# Patient Record
Sex: FEMALE | Race: WHITE | NOT HISPANIC OR LATINO | Employment: PART TIME | URBAN - METROPOLITAN AREA
[De-identification: names, ages, dates, MRNs, and addresses within clinical notes are randomized per-mention and may not be internally consistent; named-entity substitution may affect disease eponyms.]

---

## 2019-02-21 ENCOUNTER — OFFICE VISIT (OUTPATIENT)
Dept: RHEUMATOLOGY | Facility: CLINIC | Age: 39
End: 2019-02-21
Payer: COMMERCIAL

## 2019-02-21 VITALS
RESPIRATION RATE: 16 BRPM | HEIGHT: 65 IN | DIASTOLIC BLOOD PRESSURE: 90 MMHG | TEMPERATURE: 98 F | SYSTOLIC BLOOD PRESSURE: 140 MMHG | HEART RATE: 72 BPM | BODY MASS INDEX: 24.49 KG/M2 | WEIGHT: 147 LBS

## 2019-02-21 DIAGNOSIS — M79.7 FIBROMYOSITIS: ICD-10-CM

## 2019-02-21 DIAGNOSIS — M46.90 INFLAMMATORY SPONDYLOPATHY, UNSPECIFIED SPINAL REGION: Primary | ICD-10-CM

## 2019-02-21 DIAGNOSIS — M54.17 LUMBOSACRAL RADICULOPATHY: ICD-10-CM

## 2019-02-21 PROBLEM — M47.817 LUMBOSACRAL SPONDYLOSIS: Status: ACTIVE | Noted: 2019-02-21

## 2019-02-21 PROBLEM — C73 PAPILLARY THYROID CARCINOMA: Status: ACTIVE | Noted: 2019-02-21

## 2019-02-21 PROBLEM — E04.9 GOITER: Status: ACTIVE | Noted: 2019-02-21

## 2019-02-21 PROBLEM — I73.00 RAYNAUD'S PHENOMENON: Status: ACTIVE | Noted: 2018-03-13

## 2019-02-21 PROBLEM — M70.60 TROCHANTERIC BURSITIS: Status: ACTIVE | Noted: 2019-02-21

## 2019-02-21 PROBLEM — I34.1 MITRAL VALVE PROLAPSE: Status: ACTIVE | Noted: 2019-02-21

## 2019-02-21 PROBLEM — M02.10: Status: ACTIVE | Noted: 2019-02-21

## 2019-02-21 PROBLEM — E11.9 TYPE 2 DIABETES MELLITUS WITHOUT COMPLICATION: Status: ACTIVE | Noted: 2019-02-21

## 2019-02-21 PROCEDURE — 99214 OFFICE O/P EST MOD 30 MIN: CPT | Mod: ,,, | Performed by: INTERNAL MEDICINE

## 2019-02-21 PROCEDURE — 99214 PR OFFICE/OUTPT VISIT, EST, LEVL IV, 30-39 MIN: ICD-10-PCS | Mod: ,,, | Performed by: INTERNAL MEDICINE

## 2019-02-21 RX ORDER — HYDROXYCHLOROQUINE SULFATE 200 MG/1
TABLET, FILM COATED ORAL
COMMUNITY
End: 2019-06-14 | Stop reason: SDUPTHER

## 2019-02-21 RX ORDER — HYDROCODONE BITARTRATE AND ACETAMINOPHEN 10; 325 MG/1; MG/1
TABLET ORAL
COMMUNITY
End: 2019-04-11 | Stop reason: SDUPTHER

## 2019-02-21 RX ORDER — CYCLOBENZAPRINE HCL 10 MG
TABLET ORAL
COMMUNITY
End: 2019-06-14 | Stop reason: SDUPTHER

## 2019-02-21 RX ORDER — SALSALATE 750 MG
TABLET ORAL
COMMUNITY
End: 2019-06-14 | Stop reason: SDUPTHER

## 2019-02-21 RX ORDER — CITALOPRAM 20 MG/1
TABLET, FILM COATED ORAL
COMMUNITY
End: 2019-11-01 | Stop reason: SDUPTHER

## 2019-02-21 RX ORDER — ZOLPIDEM TARTRATE 10 MG/1
TABLET ORAL
COMMUNITY
End: 2019-06-14 | Stop reason: SDUPTHER

## 2019-02-21 RX ORDER — OMEPRAZOLE 20 MG/1
CAPSULE, DELAYED RELEASE ORAL
COMMUNITY
End: 2019-06-14 | Stop reason: SDUPTHER

## 2019-02-21 RX ORDER — GABAPENTIN 300 MG/1
CAPSULE ORAL
COMMUNITY
End: 2019-06-14 | Stop reason: SDUPTHER

## 2019-02-21 RX ORDER — LEVOTHYROXINE SODIUM 150 UG/1
TABLET ORAL
COMMUNITY

## 2019-02-21 NOTE — PROGRESS NOTES
Subjective:       Patient ID: Gay Austin is a 39 y.o. female.    Chief Complaint: Follow-up    HPI No acutely swollen tender painful inflammed jointand continue on Plaquenil 200 mg BID with no ocular toxicity  Last eye clearance was OK.No GI upset with Salsalat as long as she takes omeprazole  And lower back and sciatica helped by Gabpapentin 300 mg 3 a day . Raynaus phenomena present with cold expsure with no finger tip ulcers . Hip Greater trochanteric bursiis stil symptomatic. Follow thyroid condition by [facility] Dr Godinez.         Current medications include:  Current Outpatient Medications on File Prior to Visit   Medication Sig Dispense Refill    citalopram (CELEXA) 20 MG tablet citalopram 20 mg tablet   Take 1 tablet once daily      cyclobenzaprine (FLEXERIL) 10 MG tablet cyclobenzaprine 10 mg tablet      gabapentin (NEURONTIN) 300 MG capsule gabapentin 300 mg capsule      HYDROcodone-acetaminophen (NORCO)  mg per tablet hydrocodone 10 mg-acetaminophen 325 mg tablet   Take 1 tablet by mouth 2-3 times a day as needed for severe pain only. (MUST LAST 30 DAYS)      hydroxychloroquine (PLAQUENIL) 200 mg tablet hydroxychloroquine 200 mg tablet      levothyroxine (SYNTHROID) 150 MCG tablet Synthroid 150 mcg tablet      norgestrel-ethinyl estradiol (LOW-OGESTREL, 28,) 0.3-30 mg-mcg per tablet Low-Ogestrel (28) 0.3 mg-30 mcg tablet      omeprazole (PRILOSEC) 20 MG capsule omeprazole 20 mg capsule,delayed release      salsalate (DISALCID) 750 MG Tab salsalate 750 mg tablet      zolpidem (AMBIEN) 10 mg Tab zolpidem 10 mg tablet   Take 1 tablet at bedtime as needed       No current facility-administered medications on file prior to visit.        Lab Results:  No results found for: WBC, RBC, HGB, HCT, MCV, COLORU, SPECGRAV, PHUR, WBCUR, NITRITE, GLUCOSEUR, KETONESU, BILIRUBINUR, RBCUR, NA, K, CL, CO2, GLU, BUN, CREATININE     Review of Systems   Constitutional: Positive for chills and fever.         "Fever and chills with URI    Musculoskeletal: Positive for back pain and myalgias.   Neurological: Positive for numbness (legs).         Objective:   BP (!) 140/90 (BP Location: Right arm, Patient Position: Sitting, BP Method: Small (Manual))   Pulse 72   Temp 97.5 °F (36.4 °C) (Temporal)   Resp 16   Ht 5' 5" (1.651 m)   Wt 66.7 kg (147 lb)   LMP 02/04/2019 (Exact Date)   BMI 24.46 kg/m²      Physical Exam   Constitutional: She is well-developed, well-nourished, and in no distress.   Neck:   Pain in neck   Neurological:   Burning and tingling in legs with low back pain         Assessment:       1. Inflammatory spondylopathy, unspecified spinal region    2. Lumbosacral radiculopathy    3. Fibromyositis            Plan:       Continue current medicine and dosis     "

## 2019-03-11 ENCOUNTER — TELEPHONE (OUTPATIENT)
Dept: RHEUMATOLOGY | Facility: CLINIC | Age: 39
End: 2019-03-11

## 2019-03-11 DIAGNOSIS — M54.17 LUMBOSACRAL RADICULOPATHY: Primary | ICD-10-CM

## 2019-03-11 RX ORDER — HYDROCODONE BITARTRATE AND ACETAMINOPHEN 10; 325 MG/1; MG/1
1 TABLET ORAL 3 TIMES DAILY PRN
Qty: 90 TABLET | Refills: 0 | Status: SHIPPED | OUTPATIENT
Start: 2019-03-11 | End: 2019-04-10

## 2019-04-11 DIAGNOSIS — M54.17 LUMBOSACRAL RADICULOPATHY: Primary | ICD-10-CM

## 2019-04-12 ENCOUNTER — TELEPHONE (OUTPATIENT)
Dept: RHEUMATOLOGY | Facility: CLINIC | Age: 39
End: 2019-04-12

## 2019-04-12 RX ORDER — HYDROCODONE BITARTRATE AND ACETAMINOPHEN 10; 325 MG/1; MG/1
TABLET ORAL
Qty: 90 TABLET | Refills: 0 | Status: SHIPPED | OUTPATIENT
Start: 2019-04-12 | End: 2019-05-14 | Stop reason: SDUPTHER

## 2019-05-14 ENCOUNTER — TELEPHONE (OUTPATIENT)
Dept: RHEUMATOLOGY | Facility: CLINIC | Age: 39
End: 2019-05-14

## 2019-05-14 DIAGNOSIS — M54.17 LUMBOSACRAL RADICULOPATHY: ICD-10-CM

## 2019-05-14 RX ORDER — HYDROCODONE BITARTRATE AND ACETAMINOPHEN 10; 325 MG/1; MG/1
TABLET ORAL
Qty: 90 TABLET | Refills: 0 | Status: SHIPPED | OUTPATIENT
Start: 2019-05-14 | End: 2019-06-14 | Stop reason: SDUPTHER

## 2019-06-14 ENCOUNTER — OFFICE VISIT (OUTPATIENT)
Dept: RHEUMATOLOGY | Facility: CLINIC | Age: 39
End: 2019-06-14
Payer: COMMERCIAL

## 2019-06-14 VITALS
SYSTOLIC BLOOD PRESSURE: 140 MMHG | RESPIRATION RATE: 16 BRPM | HEIGHT: 65 IN | WEIGHT: 145 LBS | HEART RATE: 80 BPM | BODY MASS INDEX: 24.16 KG/M2 | TEMPERATURE: 97 F | DIASTOLIC BLOOD PRESSURE: 90 MMHG

## 2019-06-14 DIAGNOSIS — E03.9 HYPOTHYROIDISM, UNSPECIFIED TYPE: ICD-10-CM

## 2019-06-14 DIAGNOSIS — G47.00 INSOMNIA, UNSPECIFIED TYPE: ICD-10-CM

## 2019-06-14 DIAGNOSIS — M70.60 TROCHANTERIC BURSITIS, UNSPECIFIED LATERALITY: ICD-10-CM

## 2019-06-14 DIAGNOSIS — I73.00 RAYNAUD'S PHENOMENON WITHOUT GANGRENE: ICD-10-CM

## 2019-06-14 DIAGNOSIS — R25.2 SPASM: Primary | ICD-10-CM

## 2019-06-14 DIAGNOSIS — M47.817 SPONDYLOSIS OF LUMBOSACRAL REGION WITHOUT MYELOPATHY OR RADICULOPATHY: ICD-10-CM

## 2019-06-14 DIAGNOSIS — M79.7 FIBROMYALGIA: ICD-10-CM

## 2019-06-14 DIAGNOSIS — E04.9 GOITER: ICD-10-CM

## 2019-06-14 DIAGNOSIS — K21.9 GERD WITHOUT ESOPHAGITIS: ICD-10-CM

## 2019-06-14 DIAGNOSIS — M02.10: ICD-10-CM

## 2019-06-14 DIAGNOSIS — M54.17 LUMBOSACRAL RADICULOPATHY: ICD-10-CM

## 2019-06-14 DIAGNOSIS — M46.90 INFLAMMATORY SPONDYLOPATHY, UNSPECIFIED SPINAL REGION: ICD-10-CM

## 2019-06-14 PROCEDURE — 99214 OFFICE O/P EST MOD 30 MIN: CPT | Mod: S$GLB,,, | Performed by: INTERNAL MEDICINE

## 2019-06-14 PROCEDURE — 99214 PR OFFICE/OUTPT VISIT, EST, LEVL IV, 30-39 MIN: ICD-10-PCS | Mod: S$GLB,,, | Performed by: INTERNAL MEDICINE

## 2019-06-14 RX ORDER — OMEPRAZOLE 20 MG/1
CAPSULE, DELAYED RELEASE ORAL
Qty: 30 CAPSULE | Refills: 3 | Status: SHIPPED | OUTPATIENT
Start: 2019-06-14 | End: 2019-11-01 | Stop reason: SDUPTHER

## 2019-06-14 RX ORDER — HYDROCODONE BITARTRATE AND ACETAMINOPHEN 10; 325 MG/1; MG/1
TABLET ORAL
Qty: 90 TABLET | Refills: 0 | Status: SHIPPED | OUTPATIENT
Start: 2019-06-14 | End: 2019-07-17 | Stop reason: SDUPTHER

## 2019-06-14 RX ORDER — HYDROXYCHLOROQUINE SULFATE 200 MG/1
TABLET, FILM COATED ORAL
Qty: 60 TABLET | Refills: 3 | Status: SHIPPED | OUTPATIENT
Start: 2019-06-14 | End: 2019-11-01 | Stop reason: SDUPTHER

## 2019-06-14 RX ORDER — SALSALATE 750 MG
TABLET ORAL
Qty: 60 TABLET | Refills: 3 | Status: SHIPPED | OUTPATIENT
Start: 2019-06-14 | End: 2019-11-01 | Stop reason: SDUPTHER

## 2019-06-14 RX ORDER — CYCLOBENZAPRINE HCL 10 MG
TABLET ORAL
Qty: 60 TABLET | Refills: 3 | Status: SHIPPED | OUTPATIENT
Start: 2019-06-14 | End: 2019-11-01 | Stop reason: SDUPTHER

## 2019-06-14 RX ORDER — GABAPENTIN 300 MG/1
CAPSULE ORAL
Qty: 90 CAPSULE | Refills: 3 | Status: SHIPPED | OUTPATIENT
Start: 2019-06-14 | End: 2019-11-01 | Stop reason: SDUPTHER

## 2019-06-14 RX ORDER — ZOLPIDEM TARTRATE 10 MG/1
TABLET ORAL
Qty: 30 TABLET | Refills: 3 | Status: SHIPPED | OUTPATIENT
Start: 2019-06-14 | End: 2019-11-01 | Stop reason: SDUPTHER

## 2019-06-14 NOTE — PROGRESS NOTES
Subjective:       Patient ID: Gay Austin is a 39 y.o. female.    Chief Complaint: Follow-up    HPI Continue on Plaquenil 200 mg BIDv with no ocular toxicity. Gabapentin 300m, m 3m tabs a dayand take omeprazole with salsalate with no gi upset. . Has had swelling in feet and episode of rt hip pain severe helped by a sjhort course of prednisone, Contibnue on thyroid disease        Current medications include:  Current Outpatient Medications on File Prior to Visit   Medication Sig Dispense Refill    citalopram (CELEXA) 20 MG tablet citalopram 20 mg tablet   Take 1 tablet once daily      levothyroxine (SYNTHROID) 150 MCG tablet Synthroid 150 mcg tablet      norgestrel-ethinyl estradiol (LOW-OGESTREL, 28,) 0.3-30 mg-mcg per tablet Low-Ogestrel (28) 0.3 mg-30 mcg tablet      [DISCONTINUED] cyclobenzaprine (FLEXERIL) 10 MG tablet cyclobenzaprine 10 mg tablet      [DISCONTINUED] gabapentin (NEURONTIN) 300 MG capsule gabapentin 300 mg capsule      [DISCONTINUED] HYDROcodone-acetaminophen (NORCO)  mg per tablet hydrocodone 10 mg-acetaminophen 325 mg tablet   Take 1 tablet by mouth 2-3 times a day as needed for severe pain only. (MUST LAST 30 DAYS) 90 tablet 0    [DISCONTINUED] hydroxychloroquine (PLAQUENIL) 200 mg tablet hydroxychloroquine 200 mg tablet      [DISCONTINUED] omeprazole (PRILOSEC) 20 MG capsule omeprazole 20 mg capsule,delayed release      [DISCONTINUED] salsalate (DISALCID) 750 MG Tab salsalate 750 mg tablet      [DISCONTINUED] zolpidem (AMBIEN) 10 mg Tab zolpidem 10 mg tablet   Take 1 tablet at bedtime as needed       No current facility-administered medications on file prior to visit.        Lab Results:  No results found for: WBC, RBC, HGB, HCT, MCV, COLORU, SPECGRAV, PHUR, WBCUR, NITRITE, GLUCOSEUR, KETONESU, BILIRUBINUR, RBCUR, NA, K, CL, CO2, GLU, BUN, CREATININE     Review of Systems   Constitutional: Positive for diaphoresis.   HENT: Negative.    Eyes: Negative.    Respiratory:  "Negative.    Cardiovascular: Negative.    Gastrointestinal:        Heartburn   Endocrine:        Hypothyroidism   Genitourinary: Negative.    Musculoskeletal: Positive for arthralgias and back pain.   Allergic/Immunologic: Positive for environmental allergies and food allergies.   Neurological: Negative.         Num,bness tingling in legs andn ee   Hematological: Negative.    Psychiatric/Behavioral: Negative.          Objective:   BP (!) 140/90 (BP Location: Right arm, Patient Position: Sitting, BP Method: Small (Manual))   Pulse 80   Temp 97.4 °F (36.3 °C) (Temporal)   Resp 16   Ht 5' 5" (1.651 m)   Wt 65.8 kg (145 lb)   LMP 05/27/2019   BMI 24.13 kg/m²      Physical Exam   HENT:   Head: Normocephalic.   Trapezius trigger point bilaterally   Eyes: Conjunctivae and EOM are normal. Pupils are equal, round, and reactive to light.   Neck: Normal range of motion.   Cardiovascular: Normal rate and regular rhythm.    Pulmonary/Chest: Effort normal and breath sounds normal.   Abdominal: Soft. Bowel sounds are normal.   Neurological:   No acutely swollen tender inflammed joit   Skin: Skin is warm and dry.     Musculoskeletal:   SL=negative           Assessment:       1. Spasm    2. Fibromyalgia    3. Lumbosacral radiculopathy    4. Spondylosis of lumbosacral region without myelopathy or radiculopathy    5. GERD without esophagitis    6. Insomnia, unspecified type    7. Inflammatory spondylopathy, unspecified spinal region    8. Post-dysenteric reactive arthropathy    9. Trochanteric bursitis, unspecified laterality    10. Hypothyroidism, unspecified type    11. Goiter    12. Raynaud's phenomenon without gangrene            Plan:       will continue same medication and dosis     "

## 2019-07-17 DIAGNOSIS — M54.17 LUMBOSACRAL RADICULOPATHY: ICD-10-CM

## 2019-07-18 ENCOUNTER — TELEPHONE (OUTPATIENT)
Dept: RHEUMATOLOGY | Facility: CLINIC | Age: 39
End: 2019-07-18

## 2019-07-18 RX ORDER — HYDROCODONE BITARTRATE AND ACETAMINOPHEN 10; 325 MG/1; MG/1
TABLET ORAL
Qty: 90 TABLET | Refills: 0 | Status: SHIPPED | OUTPATIENT
Start: 2019-07-18 | End: 2019-08-19 | Stop reason: SDUPTHER

## 2019-08-19 DIAGNOSIS — M54.17 LUMBOSACRAL RADICULOPATHY: ICD-10-CM

## 2019-08-20 RX ORDER — HYDROCODONE BITARTRATE AND ACETAMINOPHEN 10; 325 MG/1; MG/1
TABLET ORAL
Qty: 90 TABLET | Refills: 0 | Status: SHIPPED | OUTPATIENT
Start: 2019-08-20 | End: 2019-09-20 | Stop reason: SDUPTHER

## 2019-09-20 ENCOUNTER — TELEPHONE (OUTPATIENT)
Dept: RHEUMATOLOGY | Facility: CLINIC | Age: 39
End: 2019-09-20

## 2019-09-20 DIAGNOSIS — M54.17 LUMBOSACRAL RADICULOPATHY: ICD-10-CM

## 2019-09-20 RX ORDER — HYDROCODONE BITARTRATE AND ACETAMINOPHEN 10; 325 MG/1; MG/1
TABLET ORAL
Qty: 90 TABLET | Refills: 0 | Status: SHIPPED | OUTPATIENT
Start: 2019-09-20 | End: 2019-11-01 | Stop reason: SDUPTHER

## 2019-11-01 ENCOUNTER — OFFICE VISIT (OUTPATIENT)
Dept: RHEUMATOLOGY | Facility: CLINIC | Age: 39
End: 2019-11-01
Payer: COMMERCIAL

## 2019-11-01 VITALS
SYSTOLIC BLOOD PRESSURE: 132 MMHG | HEIGHT: 65 IN | TEMPERATURE: 97 F | OXYGEN SATURATION: 99 % | DIASTOLIC BLOOD PRESSURE: 92 MMHG | RESPIRATION RATE: 16 BRPM | WEIGHT: 150.38 LBS | HEART RATE: 81 BPM | BODY MASS INDEX: 25.05 KG/M2

## 2019-11-01 DIAGNOSIS — R25.2 SPASM: ICD-10-CM

## 2019-11-01 DIAGNOSIS — F32.A DEPRESSION, UNSPECIFIED DEPRESSION TYPE: ICD-10-CM

## 2019-11-01 DIAGNOSIS — K21.9 GERD WITHOUT ESOPHAGITIS: ICD-10-CM

## 2019-11-01 DIAGNOSIS — M46.90 INFLAMMATORY SPONDYLOPATHY, UNSPECIFIED SPINAL REGION: ICD-10-CM

## 2019-11-01 DIAGNOSIS — I73.00 RAYNAUD'S PHENOMENON WITHOUT GANGRENE: ICD-10-CM

## 2019-11-01 DIAGNOSIS — I34.1 MITRAL VALVE PROLAPSE: ICD-10-CM

## 2019-11-01 DIAGNOSIS — E11.9 TYPE 2 DIABETES MELLITUS WITHOUT COMPLICATION, UNSPECIFIED WHETHER LONG TERM INSULIN USE: ICD-10-CM

## 2019-11-01 DIAGNOSIS — M54.17 LUMBOSACRAL RADICULOPATHY: ICD-10-CM

## 2019-11-01 DIAGNOSIS — M47.817 SPONDYLOSIS OF LUMBOSACRAL REGION WITHOUT MYELOPATHY OR RADICULOPATHY: ICD-10-CM

## 2019-11-01 DIAGNOSIS — M79.7 FIBROMYOSITIS: ICD-10-CM

## 2019-11-01 DIAGNOSIS — I10 HYPERTENSION, UNSPECIFIED TYPE: Primary | ICD-10-CM

## 2019-11-01 DIAGNOSIS — G47.00 INSOMNIA, UNSPECIFIED TYPE: ICD-10-CM

## 2019-11-01 DIAGNOSIS — M79.7 FIBROMYALGIA: ICD-10-CM

## 2019-11-01 PROCEDURE — 99214 PR OFFICE/OUTPT VISIT, EST, LEVL IV, 30-39 MIN: ICD-10-PCS | Mod: S$GLB,,, | Performed by: INTERNAL MEDICINE

## 2019-11-01 PROCEDURE — 99214 OFFICE O/P EST MOD 30 MIN: CPT | Mod: S$GLB,,, | Performed by: INTERNAL MEDICINE

## 2019-11-01 RX ORDER — OMEPRAZOLE 20 MG/1
CAPSULE, DELAYED RELEASE ORAL
Qty: 30 CAPSULE | Refills: 3 | Status: SHIPPED | OUTPATIENT
Start: 2019-11-01 | End: 2020-01-03 | Stop reason: SDUPTHER

## 2019-11-01 RX ORDER — FOLIC ACID 1 MG/1
2 TABLET ORAL DAILY
Qty: 60 TABLET | Refills: 11 | Status: SHIPPED | OUTPATIENT
Start: 2019-11-01 | End: 2020-10-31

## 2019-11-01 RX ORDER — METHOTREXATE 2.5 MG/1
10 TABLET ORAL
Qty: 4 TABLET | Refills: 3 | Status: SHIPPED | OUTPATIENT
Start: 2019-11-01 | End: 2020-01-03 | Stop reason: SDUPTHER

## 2019-11-01 RX ORDER — HYDROXYCHLOROQUINE SULFATE 200 MG/1
TABLET, FILM COATED ORAL
Qty: 60 TABLET | Refills: 3 | Status: SHIPPED | OUTPATIENT
Start: 2019-11-01 | End: 2020-01-03 | Stop reason: SDUPTHER

## 2019-11-01 RX ORDER — SALSALATE 750 MG
TABLET ORAL
Qty: 60 TABLET | Refills: 3 | Status: SHIPPED | OUTPATIENT
Start: 2019-11-01 | End: 2020-01-03 | Stop reason: SDUPTHER

## 2019-11-01 RX ORDER — CYCLOBENZAPRINE HCL 10 MG
TABLET ORAL
Qty: 60 TABLET | Refills: 3 | Status: SHIPPED | OUTPATIENT
Start: 2019-11-01 | End: 2020-01-03 | Stop reason: SDUPTHER

## 2019-11-01 RX ORDER — GABAPENTIN 300 MG/1
CAPSULE ORAL
Qty: 90 CAPSULE | Refills: 3 | Status: SHIPPED | OUTPATIENT
Start: 2019-11-01 | End: 2020-01-03 | Stop reason: SDUPTHER

## 2019-11-01 RX ORDER — ZOLPIDEM TARTRATE 10 MG/1
TABLET ORAL
Qty: 30 TABLET | Refills: 3 | Status: SHIPPED | OUTPATIENT
Start: 2019-11-01

## 2019-11-01 RX ORDER — HYDROCODONE BITARTRATE AND ACETAMINOPHEN 10; 325 MG/1; MG/1
TABLET ORAL
Qty: 90 TABLET | Refills: 0 | Status: SHIPPED | OUTPATIENT
Start: 2019-11-01 | End: 2019-12-06 | Stop reason: SDUPTHER

## 2019-11-01 RX ORDER — FOLIC ACID 1 MG/1
2 TABLET ORAL DAILY
Qty: 60 TABLET | Refills: 11 | Status: SHIPPED | OUTPATIENT
Start: 2019-11-01 | End: 2019-11-01 | Stop reason: SDUPTHER

## 2019-11-01 RX ORDER — AMLODIPINE BESYLATE 5 MG/1
5 TABLET ORAL DAILY
Qty: 30 TABLET | Refills: 11 | Status: SHIPPED | OUTPATIENT
Start: 2019-11-01 | End: 2020-10-31

## 2019-11-01 RX ORDER — CITALOPRAM 20 MG/1
TABLET, FILM COATED ORAL
Qty: 30 TABLET | Refills: 3 | Status: SHIPPED | OUTPATIENT
Start: 2019-11-01

## 2019-11-01 RX ORDER — AMLODIPINE BESYLATE 5 MG/1
5 TABLET ORAL DAILY
Qty: 30 TABLET | Refills: 11 | Status: SHIPPED | OUTPATIENT
Start: 2019-11-01 | End: 2019-11-01 | Stop reason: SDUPTHER

## 2019-11-01 RX ORDER — METHOTREXATE 2.5 MG/1
10 TABLET ORAL
Qty: 4 TABLET | Refills: 3 | Status: SHIPPED | OUTPATIENT
Start: 2019-11-01 | End: 2019-11-01 | Stop reason: SDUPTHER

## 2019-11-01 NOTE — PROGRESS NOTES
Subjective:       Patient ID: Gay Austin is a 39 y.o. female.    Chief Complaint: No chief complaint on file.    HPI Hands with numbness and pain for 1 year worse when cool outside. Hand with swelling in PIP s  And feet with some sweling and pain. Low back, paikn radiating to legs  . Continue on Gabapentin 300 mg three caps a sday, Plaquenil 200 mg Bid with no ocular toxicity . Has dry mouth at times        Current medications include:  Current Outpatient Medications on File Prior to Visit   Medication Sig Dispense Refill    citalopram (CELEXA) 20 MG tablet citalopram 20 mg tablet   Take 1 tablet once daily      cyclobenzaprine (FLEXERIL) 10 MG tablet Take 1 tab BID 60 tablet 3    gabapentin (NEURONTIN) 300 MG capsule Take 1 capsule TID 90 capsule 3    HYDROcodone-acetaminophen (NORCO)  mg per tablet Take 1 tablet by mouth 2-3 times a day as needed for severe pain only. (MUST LAST 30 DAYS). Greater than 7 day supply is medically necessary. 90 tablet 0    hydroxychloroquine (PLAQUENIL) 200 mg tablet Take 1 tablet BID 60 tablet 3    levothyroxine (SYNTHROID) 150 MCG tablet Synthroid 150 mcg tablet      norgestrel-ethinyl estradiol (LOW-OGESTREL, 28,) 0.3-30 mg-mcg per tablet Low-Ogestrel (28) 0.3 mg-30 mcg tablet      omeprazole (PRILOSEC) 20 MG capsule Take 1 capsule once daily 30 capsule 3    salsalate (DISALCID) 750 MG Tab Take 1 tablet BID 60 tablet 3    zolpidem (AMBIEN) 10 mg Tab Take 1 tablet once at bedtime 30 tablet 3     No current facility-administered medications on file prior to visit.        Lab Results:  No results found for: WBC, RBC, HGB, HCT, MCV, COLORU, SPECGRAV, PHUR, WBCUR, NITRITE, GLUCOSEUR, KETONESU, BILIRUBINUR, RBCUR, NA, K, CL, CO2, GLU, BUN, CREATININE     Review of Systems   Constitutional: Negative.    HENT:        Dry moutjh   Eyes: Negative.    Respiratory: Negative.    Cardiovascular: Negative.    Gastrointestinal: Negative.    Endocrine:        Hypothyroidism  "post Thyroidectomy for Thyroid cancer   Genitourinary: Negative.    Musculoskeletal: Positive for arthralgias and back pain.   Allergic/Immunologic: Negative.    Neurological: Positive for numbness.        Tingling in legs and feet   Hematological: Negative.    Psychiatric/Behavioral: Negative.          Objective:   BP (!) 132/92 (BP Location: Right arm, Patient Position: Sitting, BP Method: Small (Manual))   Pulse 81   Temp 97.4 °F (36.3 °C) (Temporal)   Resp 16   Ht 5' 5" (1.651 m)   Wt 68.2 kg (150 lb 6.4 oz)   SpO2 99%   BMI 25.03 kg/m²      Physical Exam   Constitutional: She is well-developed, well-nourished, and in no distress.   HENT:   Head: Normocephalic and atraumatic.   No dryness in eyes and mouth   Eyes: Conjunctivae and EOM are normal. Pupils are equal, round, and reactive to light.   Neck: Normal range of motion. Neck supple.   Cardiovascular: Normal rate and regular rhythm.    Pulmonary/Chest: Effort normal and breath sounds normal.   Abdominal: Soft. Bowel sounds are normal.   Neurological:   SLR= posiive bilaterally   Skin: Skin is warm and dry.     Psychiatric: Mood, memory, affect and judgment normal.   Musculoskeletal:   Tenderness in wrists hand mcp , tenderness along LS spine and SI joints. And hip,greater trochanter bursitis           Assessment:       1. Inflammatory spondylopathy, unspecified spinal region    2. Fibromyositis    3. Raynaud's phenomenon without gangrene    4. Mitral valve prolapse    5. Spondylosis of lumbosacral region without myelopathy or radiculopathy    6. Lumbosacral radiculopathy    7. Type 2 diabetes mellitus without complication, unspecified whether long term insulin use            Plan:       Has had incrased amount tof arthralgias.  will add methtrexate for increase of RA arthritis symptoms   "

## 2019-12-06 DIAGNOSIS — M54.17 LUMBOSACRAL RADICULOPATHY: ICD-10-CM

## 2019-12-09 ENCOUNTER — TELEPHONE (OUTPATIENT)
Dept: RHEUMATOLOGY | Facility: CLINIC | Age: 39
End: 2019-12-09

## 2019-12-09 RX ORDER — HYDROCODONE BITARTRATE AND ACETAMINOPHEN 10; 325 MG/1; MG/1
TABLET ORAL
Qty: 90 TABLET | Refills: 0 | Status: SHIPPED | OUTPATIENT
Start: 2019-12-09 | End: 2020-01-09 | Stop reason: SDUPTHER

## 2020-01-03 ENCOUNTER — OFFICE VISIT (OUTPATIENT)
Dept: RHEUMATOLOGY | Facility: CLINIC | Age: 40
End: 2020-01-03
Payer: COMMERCIAL

## 2020-01-03 VITALS
SYSTOLIC BLOOD PRESSURE: 120 MMHG | BODY MASS INDEX: 24.16 KG/M2 | DIASTOLIC BLOOD PRESSURE: 80 MMHG | TEMPERATURE: 97 F | HEIGHT: 65 IN | HEART RATE: 67 BPM | RESPIRATION RATE: 16 BRPM | OXYGEN SATURATION: 98 % | WEIGHT: 145 LBS

## 2020-01-03 DIAGNOSIS — M47.817 SPONDYLOSIS OF LUMBOSACRAL REGION WITHOUT MYELOPATHY OR RADICULOPATHY: ICD-10-CM

## 2020-01-03 DIAGNOSIS — M79.7 FIBROMYOSITIS: ICD-10-CM

## 2020-01-03 DIAGNOSIS — K21.9 GERD WITHOUT ESOPHAGITIS: ICD-10-CM

## 2020-01-03 DIAGNOSIS — M02.10: ICD-10-CM

## 2020-01-03 DIAGNOSIS — M79.7 FIBROMYALGIA: ICD-10-CM

## 2020-01-03 DIAGNOSIS — I73.00 RAYNAUD'S PHENOMENON WITHOUT GANGRENE: ICD-10-CM

## 2020-01-03 DIAGNOSIS — M46.90 INFLAMMATORY SPONDYLOPATHY, UNSPECIFIED SPINAL REGION: ICD-10-CM

## 2020-01-03 DIAGNOSIS — M70.60 TROCHANTERIC BURSITIS, UNSPECIFIED LATERALITY: ICD-10-CM

## 2020-01-03 DIAGNOSIS — R25.2 SPASM: ICD-10-CM

## 2020-01-03 DIAGNOSIS — Z79.899 HISTORY OF ONGOING TREATMENT WITH HIGH-RISK MEDICATION: ICD-10-CM

## 2020-01-03 DIAGNOSIS — M54.17 LUMBOSACRAL RADICULOPATHY: ICD-10-CM

## 2020-01-03 LAB
AMPHETAMINES (500): NEGATIVE NG/ML
BARBITURATES (200): NEGATIVE NG/ML
BENZODIAZEPINES: NEGATIVE NG/ML
COCAINE (150): NEGATIVE NG/ML
MARIJUANA, THC (50): NEGATIVE NG/ML
METHADONE: NEGATIVE NG/ML
OPIATES: ABNORMAL NG/ML
OXYCODONE: NEGATIVE NG/ML
PH: 7 (ref 4.5–8)
PHENCYCLIDINE (25): NEGATIVE NG/ML
URINE CREATININE D/S: 105.7 MG/DL

## 2020-01-03 PROCEDURE — 99214 PR OFFICE/OUTPT VISIT, EST, LEVL IV, 30-39 MIN: ICD-10-PCS | Mod: S$GLB,,, | Performed by: INTERNAL MEDICINE

## 2020-01-03 PROCEDURE — 99214 OFFICE O/P EST MOD 30 MIN: CPT | Mod: S$GLB,,, | Performed by: INTERNAL MEDICINE

## 2020-01-03 RX ORDER — HYDROXYCHLOROQUINE SULFATE 200 MG/1
TABLET, FILM COATED ORAL
Qty: 60 TABLET | Refills: 3 | Status: SHIPPED | OUTPATIENT
Start: 2020-01-03 | End: 2020-06-09 | Stop reason: SDUPTHER

## 2020-01-03 RX ORDER — GABAPENTIN 300 MG/1
CAPSULE ORAL
Qty: 90 CAPSULE | Refills: 3 | Status: SHIPPED | OUTPATIENT
Start: 2020-01-03 | End: 2020-06-09 | Stop reason: SDUPTHER

## 2020-01-03 RX ORDER — SALSALATE 750 MG
TABLET ORAL
Qty: 60 TABLET | Refills: 3 | Status: SHIPPED | OUTPATIENT
Start: 2020-01-03 | End: 2020-06-09 | Stop reason: SDUPTHER

## 2020-01-03 RX ORDER — OMEPRAZOLE 20 MG/1
CAPSULE, DELAYED RELEASE ORAL
Qty: 30 CAPSULE | Refills: 3 | Status: SHIPPED | OUTPATIENT
Start: 2020-01-03

## 2020-01-03 RX ORDER — METHOTREXATE 2.5 MG/1
10 TABLET ORAL
Qty: 4 TABLET | Refills: 3 | Status: SHIPPED | OUTPATIENT
Start: 2020-01-03 | End: 2020-06-09 | Stop reason: SDUPTHER

## 2020-01-03 RX ORDER — CYCLOBENZAPRINE HCL 10 MG
TABLET ORAL
Qty: 60 TABLET | Refills: 3 | Status: SHIPPED | OUTPATIENT
Start: 2020-01-03 | End: 2020-06-09 | Stop reason: SDUPTHER

## 2020-01-03 NOTE — PROGRESS NOTES
Subjective:       Patient ID: Gay Austin is a 39 y.o. female.    Chief Complaint: Follow-up    HPI Continuen onn Methotrexate 4 tabs once a week and folic acid 2 mg a day, alsom on Plaquenil 200 mg Bid with no Plaquenil ocular toxicity. Mor symptomatic with pain in the neck and lower back. Has Raynauds penomenae with cold weater and no finger tip uncers. Takes Salsalate and prilosec with no GI upset. . MNo acutely swollen tender opainful inflammed joint.. Low back associated with sciatica pain. Has hip greaterm, trochanter bursitis. .        Current medications include:  Current Outpatient Medications on File Prior to Visit   Medication Sig Dispense Refill    amLODIPine (NORVASC) 5 MG tablet Take 1 tablet (5 mg total) by mouth once daily. 30 tablet 11    citalopram (CELEXA) 20 MG tablet citalopram 20 mg tablet  Take 1 tablet once daily 30 tablet 3    cyclobenzaprine (FLEXERIL) 10 MG tablet Take 1 tab BID 60 tablet 3    folic acid (FOLVITE) 1 MG tablet Take 2 tablets (2 mg total) by mouth once daily. 60 tablet 11    gabapentin (NEURONTIN) 300 MG capsule Take 1 capsule TID 90 capsule 3    HYDROcodone-acetaminophen (NORCO)  mg per tablet Take 1 tablet by mouth 2-3 times a day as needed for severe pain only. (MUST LAST 30 DAYS). Greater than 7 day supply is medically necessary. 90 tablet 0    hydroxychloroquine (PLAQUENIL) 200 mg tablet Take 1 tablet BID 60 tablet 3    levothyroxine (SYNTHROID) 150 MCG tablet Synthroid 150 mcg tablet      methotrexate 2.5 MG Tab Take 4 tablets (10 mg total) by mouth every 7 days. 4 tablet 3    norgestrel-ethinyl estradiol (LOW-OGESTREL, 28,) 0.3-30 mg-mcg per tablet Low-Ogestrel (28) 0.3 mg-30 mcg tablet      omeprazole (PRILOSEC) 20 MG capsule Take 1 capsule once daily 30 capsule 3    salsalate (DISALCID) 750 MG Tab Take 1 tablet BID 60 tablet 3    zolpidem (AMBIEN) 10 mg Tab Take 1 tablet once at bedtime 30 tablet 3     No current facility-administered  "medications on file prior to visit.        Lab Results:  No results found for: WBC, RBC, HGB, HCT, MCV, COLORU, SPECGRAV, PHUR, WBCUR, NITRITE, GLUCOSEUR, KETONESU, BILIRUBINUR, RBCUR, NA, K, CL, CO2, GLU, BUN, CREATININE     Review of Systems   Constitutional: Negative.    HENT:        Mouth dryness    Eyes: Negative.    Respiratory: Negative.    Cardiovascular: Negative.    Gastrointestinal: Negative.    Endocrine:        S/P thyroidectomy for thyroid cancer.   Genitourinary: Negative.    Musculoskeletal: Positive for arthralgias and back pain.   Allergic/Immunologic: Negative.    Neurological:        Nujmbness and tingling in legs and hands   Hematological: Negative.    Psychiatric/Behavioral: Negative.          Objective:   /80 (BP Location: Left arm, Patient Position: Sitting, BP Method: Medium (Manual))   Pulse 67   Temp 96.6 °F (35.9 °C) (Temporal)   Resp 16   Ht 5' 5" (1.651 m)   Wt 65.8 kg (145 lb)   SpO2 98%   BMI 24.13 kg/m²      Physical Exam   Constitutional: She is well-developed, well-nourished, and in no distress.   HENT:   Head: Normocephalic and atraumatic.   Eyes: Conjunctivae and EOM are normal. Pupils are equal, round, and reactive to light.   Neck: Normal range of motion. Neck supple.   Cardiovascular: Normal rate, regular rhythm and normal heart sounds.    Pulmonary/Chest: Effort normal and breath sounds normal.   Abdominal: Soft. Bowel sounds are normal.   Neurological:   SLR= positive bilaterally.    Skin: Skin is warm and dry.     Psychiatric: Mood, memory, affect and judgment normal.   Musculoskeletal:   Tenderness in rt shoulder with rt shoulder neck trigger point, bilateral knee tenderness with no swelling , lower back and botjh sacro iliac tender ,           Assessment:       1. Post-dysenteric reactive arthropathy    2. Spasm    3. Fibromyalgia    4. Inflammatory spondylopathy, unspecified spinal region    5. Spondylosis of lumbosacral region without myelopathy or " radiculopathy    6. GERD without esophagitis    7. Trochanteric bursitis, unspecified laterality    8. Fibromyositis    9. Raynaud's phenomenon without gangrene    10. Lumbosacral radiculopathy            Plan:       Stable- will continue current medications and dosis

## 2020-01-09 DIAGNOSIS — M54.17 LUMBOSACRAL RADICULOPATHY: ICD-10-CM

## 2020-01-09 RX ORDER — HYDROCODONE BITARTRATE AND ACETAMINOPHEN 10; 325 MG/1; MG/1
TABLET ORAL
Qty: 90 TABLET | Refills: 0 | Status: SHIPPED | OUTPATIENT
Start: 2020-01-09 | End: 2020-02-07 | Stop reason: SDUPTHER

## 2020-02-07 DIAGNOSIS — M54.17 LUMBOSACRAL RADICULOPATHY: ICD-10-CM

## 2020-02-10 ENCOUNTER — TELEPHONE (OUTPATIENT)
Dept: RHEUMATOLOGY | Facility: CLINIC | Age: 40
End: 2020-02-10

## 2020-02-10 RX ORDER — HYDROCODONE BITARTRATE AND ACETAMINOPHEN 10; 325 MG/1; MG/1
TABLET ORAL
Qty: 90 TABLET | Refills: 0 | Status: SHIPPED | OUTPATIENT
Start: 2020-02-10 | End: 2020-03-12 | Stop reason: SDUPTHER

## 2020-03-12 DIAGNOSIS — M54.17 LUMBOSACRAL RADICULOPATHY: ICD-10-CM

## 2020-03-13 ENCOUNTER — TELEPHONE (OUTPATIENT)
Dept: RHEUMATOLOGY | Facility: CLINIC | Age: 40
End: 2020-03-13

## 2020-03-13 RX ORDER — HYDROCODONE BITARTRATE AND ACETAMINOPHEN 10; 325 MG/1; MG/1
TABLET ORAL
Qty: 90 TABLET | Refills: 0 | Status: SHIPPED | OUTPATIENT
Start: 2020-03-13 | End: 2020-04-15 | Stop reason: SDUPTHER

## 2020-04-15 DIAGNOSIS — M54.17 LUMBOSACRAL RADICULOPATHY: ICD-10-CM

## 2020-04-16 RX ORDER — HYDROCODONE BITARTRATE AND ACETAMINOPHEN 10; 325 MG/1; MG/1
TABLET ORAL
Qty: 90 TABLET | Refills: 0 | Status: SHIPPED | OUTPATIENT
Start: 2020-04-16 | End: 2020-05-19 | Stop reason: SDUPTHER

## 2020-05-19 DIAGNOSIS — M54.17 LUMBOSACRAL RADICULOPATHY: ICD-10-CM

## 2020-05-19 RX ORDER — HYDROCODONE BITARTRATE AND ACETAMINOPHEN 10; 325 MG/1; MG/1
TABLET ORAL
Qty: 90 TABLET | Refills: 0 | Status: SHIPPED | OUTPATIENT
Start: 2020-05-19 | End: 2020-06-18 | Stop reason: SDUPTHER

## 2020-06-09 ENCOUNTER — OFFICE VISIT (OUTPATIENT)
Dept: RHEUMATOLOGY | Facility: CLINIC | Age: 40
End: 2020-06-09
Payer: COMMERCIAL

## 2020-06-09 ENCOUNTER — TELEPHONE (OUTPATIENT)
Dept: RHEUMATOLOGY | Facility: CLINIC | Age: 40
End: 2020-06-09

## 2020-06-09 VITALS
HEIGHT: 65 IN | BODY MASS INDEX: 24.16 KG/M2 | RESPIRATION RATE: 16 BRPM | HEART RATE: 91 BPM | TEMPERATURE: 97 F | WEIGHT: 145 LBS | SYSTOLIC BLOOD PRESSURE: 137 MMHG | DIASTOLIC BLOOD PRESSURE: 88 MMHG

## 2020-06-09 DIAGNOSIS — M46.90 INFLAMMATORY SPONDYLOPATHY, UNSPECIFIED SPINAL REGION: ICD-10-CM

## 2020-06-09 DIAGNOSIS — Z79.899 HISTORY OF ONGOING TREATMENT WITH HIGH-RISK MEDICATION: ICD-10-CM

## 2020-06-09 DIAGNOSIS — M79.7 FIBROMYOSITIS: Primary | ICD-10-CM

## 2020-06-09 DIAGNOSIS — M54.17 LUMBOSACRAL RADICULOPATHY: ICD-10-CM

## 2020-06-09 DIAGNOSIS — R25.2 SPASM: ICD-10-CM

## 2020-06-09 DIAGNOSIS — M47.817 SPONDYLOSIS OF LUMBOSACRAL REGION WITHOUT MYELOPATHY OR RADICULOPATHY: ICD-10-CM

## 2020-06-09 DIAGNOSIS — M79.7 FIBROMYALGIA: ICD-10-CM

## 2020-06-09 DIAGNOSIS — M02.10: ICD-10-CM

## 2020-06-09 PROCEDURE — 99214 PR OFFICE/OUTPT VISIT, EST, LEVL IV, 30-39 MIN: ICD-10-PCS | Mod: S$GLB,,, | Performed by: INTERNAL MEDICINE

## 2020-06-09 PROCEDURE — 99214 OFFICE O/P EST MOD 30 MIN: CPT | Mod: S$GLB,,, | Performed by: INTERNAL MEDICINE

## 2020-06-09 RX ORDER — GABAPENTIN 300 MG/1
CAPSULE ORAL
Qty: 90 CAPSULE | Refills: 3 | Status: SHIPPED | OUTPATIENT
Start: 2020-06-09 | End: 2020-06-09 | Stop reason: SDUPTHER

## 2020-06-09 RX ORDER — CYCLOBENZAPRINE HCL 10 MG
TABLET ORAL
Qty: 60 TABLET | Refills: 3 | Status: SHIPPED | OUTPATIENT
Start: 2020-06-09

## 2020-06-09 RX ORDER — GABAPENTIN 300 MG/1
CAPSULE ORAL
Qty: 90 CAPSULE | Refills: 3 | Status: SHIPPED | OUTPATIENT
Start: 2020-06-09

## 2020-06-09 RX ORDER — HYDROXYCHLOROQUINE SULFATE 200 MG/1
TABLET, FILM COATED ORAL
Qty: 60 TABLET | Refills: 3 | Status: SHIPPED | OUTPATIENT
Start: 2020-06-09 | End: 2020-06-09 | Stop reason: SDUPTHER

## 2020-06-09 RX ORDER — HYDROXYCHLOROQUINE SULFATE 200 MG/1
TABLET, FILM COATED ORAL
Qty: 60 TABLET | Refills: 3 | Status: SHIPPED | OUTPATIENT
Start: 2020-06-09

## 2020-06-09 RX ORDER — CYCLOBENZAPRINE HCL 10 MG
TABLET ORAL
Qty: 60 TABLET | Refills: 3 | Status: SHIPPED | OUTPATIENT
Start: 2020-06-09 | End: 2020-06-09 | Stop reason: SDUPTHER

## 2020-06-09 RX ORDER — METHOTREXATE 2.5 MG/1
10 TABLET ORAL
Qty: 4 TABLET | Refills: 3 | Status: SHIPPED | OUTPATIENT
Start: 2020-06-09 | End: 2021-06-09

## 2020-06-09 RX ORDER — SALSALATE 750 MG
TABLET ORAL
Qty: 60 TABLET | Refills: 3 | Status: SHIPPED | OUTPATIENT
Start: 2020-06-09 | End: 2020-06-09 | Stop reason: SDUPTHER

## 2020-06-09 RX ORDER — METHOTREXATE 2.5 MG/1
10 TABLET ORAL
Qty: 4 TABLET | Refills: 3 | Status: SHIPPED | OUTPATIENT
Start: 2020-06-09 | End: 2020-06-09 | Stop reason: SDUPTHER

## 2020-06-09 RX ORDER — SALSALATE 750 MG
TABLET ORAL
Qty: 60 TABLET | Refills: 3 | Status: SHIPPED | OUTPATIENT
Start: 2020-06-09

## 2020-06-09 NOTE — PROGRESS NOTES
"Subjective:       Patient ID: Gay Austin is a 40 y.o. female.    Chief Complaint: Follow-up (with lab results)    HPI Hasving paion in the knee alow back wit pain in the neck  . Takin methotrexat 4 tabs a week and folic acid. Plaquenil 200 mg bid and Gabapentin 300 mg tid. Knees bilateray with pain and swollen on and aff and neck rather constant . She has leg crams  On Flexeril and Salsalate  And on methotrexate 4 a week . Back pain an sciatica pain  No recurrence of diarrhea. Raynauds not symptomatic now with warm weather   Review of Systems   Constitutional: Negative.    HENT:        Dry mouth   Eyes: Negative.    Respiratory: Negative.    Cardiovascular: Negative.    Gastrointestinal:        Heartburns   Endocrine:        Thyroiidectomy sec to thyroid cance   Genitourinary: Negative.    Musculoskeletal: Positive for arthralgias and back pain.   Allergic/Immunologic: Negative.    Neurological: Positive for numbness.        Tingling in legs  And feet   Hematological: Negative.    Psychiatric/Behavioral: Negative.          Objective:   /88 (BP Location: Left arm, Patient Position: Sitting, BP Method: Medium (Automatic))   Pulse 91   Temp 97.1 °F (36.2 °C) (Temporal)   Resp 16   Ht 5' 5" (1.651 m)   Wt 65.8 kg (145 lb)   BMI 24.13 kg/m²      Physical Exam   Constitutional: She is oriented to person, place, and time and well-developed, well-nourished, and in no distress.   HENT:   No dryness in eyes and mouth   Eyes: Conjunctivae and EOM are normal. Pupils are equal, round, and reactive to light.   Neck: Normal range of motion. Neck supple.   Cardiovascular: Normal rate, regular rhythm and normal heart sounds.    Pulmonary/Chest: Effort normal and breath sounds normal.   Abdominal: Soft. Bowel sounds are normal.   Neurological: She is alert and oriented to person, place, and time.   Skin: Skin is warm and dry.     Psychiatric: Mood, memory, affect and judgment normal.   Musculoskeletal: Normal range " of motion.   No synovial thickening in hand joint , hip grsteer trochanter tender and hip pom, LS tendeer spinous process and SI , shouldr neck ten leslie          No data to display     Assessment:       1. Fibromyositis    2. Spasm    3. Spondylosis of lumbosacral region without myelopathy or radiculopathy    4. History of ongoing treatment with high-risk medication    5. Fibromyalgia    6. Inflammatory spondylopathy, unspecified spinal region    7. Post-dysenteric reactive arthropathy    8. Lumbosacral radiculopathy            Plan:       Problem List Items Addressed This Visit        Neuro    Lumbosacral radiculopathy    Lumbosacral spondylosis       Orthopedic    Spondylitis    Fibromyositis - Primary    Post-dysenteric reactive arthropathy      Other Visit Diagnoses     Spasm        History of ongoing treatment with high-risk medication        Fibromyalgia             wioll continue with current medicines and dsis. Spondylitis stable

## 2020-06-18 DIAGNOSIS — M54.17 LUMBOSACRAL RADICULOPATHY: ICD-10-CM

## 2020-06-19 RX ORDER — HYDROCODONE BITARTRATE AND ACETAMINOPHEN 10; 325 MG/1; MG/1
TABLET ORAL
Qty: 90 TABLET | Refills: 0 | Status: SHIPPED | OUTPATIENT
Start: 2020-06-19 | End: 2020-07-21 | Stop reason: SDUPTHER

## 2020-07-21 DIAGNOSIS — M54.17 LUMBOSACRAL RADICULOPATHY: ICD-10-CM

## 2020-07-21 NOTE — TELEPHONE ENCOUNTER
They will  because their pharmacy isn't listed when I try to add it. Can you please print it and she will .

## 2020-07-22 RX ORDER — HYDROCODONE BITARTRATE AND ACETAMINOPHEN 10; 325 MG/1; MG/1
TABLET ORAL
Qty: 90 TABLET | Refills: 0 | Status: SHIPPED | OUTPATIENT
Start: 2020-07-22